# Patient Record
Sex: MALE | Race: WHITE | NOT HISPANIC OR LATINO | Employment: FULL TIME | URBAN - METROPOLITAN AREA
[De-identification: names, ages, dates, MRNs, and addresses within clinical notes are randomized per-mention and may not be internally consistent; named-entity substitution may affect disease eponyms.]

---

## 2023-02-21 PROBLEM — G57.30 ENTRAPMENT OF SUPERFICIAL PERONEAL NERVE: Status: ACTIVE | Noted: 2023-02-21

## 2023-02-21 PROBLEM — R73.09 LOW GLUCOSE LEVEL: Status: ACTIVE | Noted: 2023-02-21

## 2023-02-21 PROBLEM — R91.1 LUNG NODULE: Status: ACTIVE | Noted: 2023-02-21

## 2023-02-21 PROBLEM — C34.90 LUNG CANCER (MULTI): Status: ACTIVE | Noted: 2023-02-21

## 2023-02-21 PROBLEM — M62.08 DIASTASIS RECTI: Status: ACTIVE | Noted: 2023-02-21

## 2023-02-21 PROBLEM — C18.9 MALIGNANT NEOPLASM OF COLON (MULTI): Status: ACTIVE | Noted: 2023-02-21

## 2023-02-21 PROBLEM — C34.92 SQUAMOUS CELL CARCINOMA OF LEFT LUNG (MULTI): Status: ACTIVE | Noted: 2023-02-21

## 2023-02-21 PROBLEM — F17.200 NICOTINE DEPENDENCE: Status: ACTIVE | Noted: 2023-02-21

## 2023-02-21 PROBLEM — F10.20 ALCOHOL ADDICTION (MULTI): Status: ACTIVE | Noted: 2023-02-21

## 2023-02-21 PROBLEM — R59.0 MEDIASTINAL LYMPHADENOPATHY: Status: ACTIVE | Noted: 2023-02-21

## 2023-02-21 PROBLEM — G89.12 ACUTE POST-THORACOTOMY PAIN: Status: ACTIVE | Noted: 2023-02-21

## 2023-02-21 PROBLEM — C18.9 COLON CANCER (MULTI): Status: ACTIVE | Noted: 2023-02-21

## 2023-02-21 PROBLEM — M54.16 LUMBAR RADICULOPATHY: Status: ACTIVE | Noted: 2023-02-21

## 2023-02-21 PROBLEM — M25.551 RIGHT HIP PAIN: Status: ACTIVE | Noted: 2023-02-21

## 2023-02-21 PROBLEM — E66.3 OVERWEIGHT WITH BODY MASS INDEX (BMI) OF 25 TO 25.9 IN ADULT: Status: ACTIVE | Noted: 2023-02-21

## 2023-02-21 PROBLEM — R29.3 ABNORMAL POSTURE: Status: ACTIVE | Noted: 2023-02-21

## 2023-02-21 PROBLEM — K59.00 CONSTIPATION: Status: ACTIVE | Noted: 2023-02-21

## 2023-02-21 PROBLEM — M54.9 BACK PAIN: Status: ACTIVE | Noted: 2023-02-21

## 2023-02-21 PROBLEM — C11.9: Status: ACTIVE | Noted: 2023-02-21

## 2023-02-21 PROBLEM — J35.8 TONSILLAR MASS: Status: ACTIVE | Noted: 2023-02-21

## 2023-02-21 RX ORDER — AMOXICILLIN 250 MG
1 CAPSULE ORAL DAILY
COMMUNITY
End: 2023-06-02 | Stop reason: ALTCHOICE

## 2023-02-21 RX ORDER — ONDANSETRON HYDROCHLORIDE 8 MG/1
TABLET, FILM COATED ORAL
COMMUNITY
End: 2023-06-02 | Stop reason: ALTCHOICE

## 2023-02-21 RX ORDER — IBUPROFEN 200 MG
1 TABLET ORAL
COMMUNITY
Start: 2022-08-12 | End: 2023-06-02 | Stop reason: ALTCHOICE

## 2023-02-21 RX ORDER — DULOXETIN HYDROCHLORIDE 30 MG/1
1 CAPSULE, DELAYED RELEASE ORAL DAILY
COMMUNITY
End: 2023-06-02 | Stop reason: ALTCHOICE

## 2023-02-21 RX ORDER — OXYCODONE HYDROCHLORIDE 5 MG/1
1 TABLET ORAL EVERY 6 HOURS PRN
COMMUNITY
End: 2023-06-02 | Stop reason: ALTCHOICE

## 2023-02-21 RX ORDER — HYDROCODONE BITARTRATE AND ACETAMINOPHEN 7.5; 325 MG/1; MG/1
1 TABLET ORAL EVERY 6 HOURS PRN
COMMUNITY
End: 2023-06-02 | Stop reason: ALTCHOICE

## 2023-03-17 ENCOUNTER — APPOINTMENT (OUTPATIENT)
Dept: PRIMARY CARE | Facility: CLINIC | Age: 58
End: 2023-03-17
Payer: COMMERCIAL

## 2023-06-02 ENCOUNTER — OFFICE VISIT (OUTPATIENT)
Dept: PRIMARY CARE | Facility: CLINIC | Age: 58
End: 2023-06-02
Payer: COMMERCIAL

## 2023-06-02 VITALS
WEIGHT: 163 LBS | HEIGHT: 68 IN | BODY MASS INDEX: 24.71 KG/M2 | HEART RATE: 110 BPM | DIASTOLIC BLOOD PRESSURE: 74 MMHG | SYSTOLIC BLOOD PRESSURE: 125 MMHG | TEMPERATURE: 98 F | OXYGEN SATURATION: 96 % | RESPIRATION RATE: 18 BRPM

## 2023-06-02 DIAGNOSIS — H66.002 ACUTE SUPPURATIVE OTITIS MEDIA OF LEFT EAR WITHOUT SPONTANEOUS RUPTURE OF TYMPANIC MEMBRANE, RECURRENCE NOT SPECIFIED: Primary | ICD-10-CM

## 2023-06-02 PROCEDURE — 99213 OFFICE O/P EST LOW 20 MIN: CPT | Performed by: STUDENT IN AN ORGANIZED HEALTH CARE EDUCATION/TRAINING PROGRAM

## 2023-06-02 PROCEDURE — 1036F TOBACCO NON-USER: CPT | Performed by: STUDENT IN AN ORGANIZED HEALTH CARE EDUCATION/TRAINING PROGRAM

## 2023-06-02 PROCEDURE — 3008F BODY MASS INDEX DOCD: CPT | Performed by: STUDENT IN AN ORGANIZED HEALTH CARE EDUCATION/TRAINING PROGRAM

## 2023-06-02 RX ORDER — AMOXICILLIN 875 MG/1
875 TABLET, FILM COATED ORAL 2 TIMES DAILY
Qty: 20 TABLET | Refills: 0 | Status: SHIPPED | OUTPATIENT
Start: 2023-06-02 | End: 2023-06-12

## 2023-06-02 ASSESSMENT — ENCOUNTER SYMPTOMS
SINUS PRESSURE: 0
RHINORRHEA: 0
SHORTNESS OF BREATH: 0
FEVER: 0
COUGH: 0

## 2023-06-02 NOTE — PROGRESS NOTES
Subjective   Noe Mejia is a 57 y.o. male who presents for Earache (Left ear pain x 1 week).  Earache   Associated symptoms include hearing loss. Pertinent negatives include no coughing, ear discharge or rhinorrhea.     Here for L ear decreased hearing for 1 week. Denies pain or soreness. No exudates. No vertigo or tinnitus. He has put hydrogen peroxide inside the canal without improvement. No recent illnesses.     Says that it has happened in the past before. Would get antibiotics with improvement.     He had chemo and radiation for lung cancer about 6 weeks ago.     Review of Systems   Constitutional:  Negative for fever.   HENT:  Positive for hearing loss. Negative for ear discharge, ear pain, postnasal drip, rhinorrhea, sinus pressure and tinnitus.    Respiratory:  Negative for cough and shortness of breath.    Cardiovascular:  Negative for chest pain.       Visit Vitals  /74 (BP Location: Right arm, Patient Position: Sitting)   Pulse 110   Temp 36.7 °C (98 °F)   Resp 18       Objective   Physical Exam  Constitutional:       General: He is not in acute distress.     Appearance: Normal appearance.   HENT:      Head: Normocephalic.      Comments: No frontal or maxillary sinus tenderness     Right Ear: Tympanic membrane normal.      Left Ear: A middle ear effusion is present.      Ears:      Perez exam findings: Lateralizes left.     Right Rinne: AC > BC.     Left Rinne: BC > AC.     Mouth/Throat:      Mouth: Mucous membranes are moist.   Eyes:      Conjunctiva/sclera: Conjunctivae normal.   Cardiovascular:      Rate and Rhythm: Normal rate and regular rhythm.      Heart sounds: Normal heart sounds.   Pulmonary:      Effort: Pulmonary effort is normal. No respiratory distress.      Breath sounds: Normal breath sounds.   Musculoskeletal:      Cervical back: Neck supple. No tenderness.   Lymphadenopathy:      Cervical: No cervical adenopathy.   Skin:     General: Skin is warm and dry.   Neurological:       Mental Status: He is alert.   Psychiatric:         Mood and Affect: Mood normal.         Behavior: Behavior normal.         Assessment/Plan   Problem List Items Addressed This Visit    None  Visit Diagnoses       Acute suppurative otitis media of left ear without spontaneous rupture of tympanic membrane, recurrence not specified    -  Primary    Relevant Medications    amoxicillin (Amoxil) 875 mg tablet          Conductive hearing loss on left side likely due to acute suppurative otitis media  Unlikely to be related to sensorineural hearing loss from chemotherapy  Prescribed amoxicillin  Follow-up in 1 week if not improved      Bhavin Reyes MD  PGY-2   Family Medicine

## 2023-10-11 ENCOUNTER — LAB (OUTPATIENT)
Dept: LAB | Facility: LAB | Age: 58
End: 2023-10-11
Payer: COMMERCIAL

## 2023-10-31 ENCOUNTER — TELEPHONE (OUTPATIENT)
Dept: PRIMARY CARE | Facility: CLINIC | Age: 58
End: 2023-10-31
Payer: COMMERCIAL